# Patient Record
Sex: FEMALE | NOT HISPANIC OR LATINO | URBAN - METROPOLITAN AREA
[De-identification: names, ages, dates, MRNs, and addresses within clinical notes are randomized per-mention and may not be internally consistent; named-entity substitution may affect disease eponyms.]

---

## 2017-09-28 ENCOUNTER — IMPORTED ENCOUNTER (OUTPATIENT)
Dept: URBAN - METROPOLITAN AREA CLINIC 50 | Facility: CLINIC | Age: 67
End: 2017-09-28

## 2017-10-05 ENCOUNTER — IMPORTED ENCOUNTER (OUTPATIENT)
Dept: URBAN - METROPOLITAN AREA CLINIC 50 | Facility: CLINIC | Age: 67
End: 2017-10-05

## 2017-10-26 ENCOUNTER — IMPORTED ENCOUNTER (OUTPATIENT)
Dept: URBAN - METROPOLITAN AREA CLINIC 50 | Facility: CLINIC | Age: 67
End: 2017-10-26

## 2017-10-31 ENCOUNTER — IMPORTED ENCOUNTER (OUTPATIENT)
Dept: URBAN - METROPOLITAN AREA CLINIC 50 | Facility: CLINIC | Age: 67
End: 2017-10-31

## 2017-11-09 ENCOUNTER — IMPORTED ENCOUNTER (OUTPATIENT)
Dept: URBAN - METROPOLITAN AREA CLINIC 50 | Facility: CLINIC | Age: 67
End: 2017-11-09

## 2017-11-09 NOTE — PATIENT DISCUSSION
"""Recommend cataract extraction with intraocular lens (IOL) OS.  Discussed the RBAs mentioned the loss part/all of vision with surgery answered patient questions and offered patient copy of consent form."" ""Phaco with IOL OS: 11/28/2017 Tecjeremy ZCB00 +22.0 Target: -0.25

## 2017-11-28 ENCOUNTER — IMPORTED ENCOUNTER (OUTPATIENT)
Dept: URBAN - METROPOLITAN AREA CLINIC 50 | Facility: CLINIC | Age: 67
End: 2017-11-28

## 2017-11-28 NOTE — PATIENT DISCUSSION
"""S/P IOL OS: Tecnis ZCB00 +22.0 (Target: -0.25) +LenSx/Arcs +TM/Omidria. Continue post operative instructions and drops per schedule. "" ""Continue Pred - Nepafenac both eyes twice a day.  follow drop schedule"""

## 2017-12-07 ENCOUNTER — IMPORTED ENCOUNTER (OUTPATIENT)
Dept: URBAN - METROPOLITAN AREA CLINIC 50 | Facility: CLINIC | Age: 67
End: 2017-12-07

## 2018-01-02 ENCOUNTER — IMPORTED ENCOUNTER (OUTPATIENT)
Dept: URBAN - METROPOLITAN AREA CLINIC 50 | Facility: CLINIC | Age: 68
End: 2018-01-02

## 2018-01-02 NOTE — PATIENT DISCUSSION
"""S/P IOL OU: OD: Tecnis ZCB00 +22.0 (Target: Distance)Femto/Arcs +TMOmidria. OS: Tecnis ZCB00 +22.0 (Target: -0.25)/Arcs +TM. ""Stop Pred - Nepafenac left eye twice a day. follow drop schedule"" ""Stop Durezol right eye twice a day.  until sample runs out."""

## 2018-04-24 ENCOUNTER — IMPORTED ENCOUNTER (OUTPATIENT)
Dept: URBAN - METROPOLITAN AREA CLINIC 50 | Facility: CLINIC | Age: 68
End: 2018-04-24

## 2018-04-24 NOTE — PATIENT DISCUSSION
"""Recommended OTC artificial tears two - four times a day as needed. "" ""Start Zaditor both eyes twice a day.  as needed"""

## 2018-10-22 ENCOUNTER — IMPORTED ENCOUNTER (OUTPATIENT)
Dept: URBAN - METROPOLITAN AREA CLINIC 50 | Facility: CLINIC | Age: 68
End: 2018-10-22

## 2019-01-14 ENCOUNTER — IMPORTED ENCOUNTER (OUTPATIENT)
Dept: URBAN - METROPOLITAN AREA CLINIC 50 | Facility: CLINIC | Age: 69
End: 2019-01-14

## 2019-03-11 ENCOUNTER — IMPORTED ENCOUNTER (OUTPATIENT)
Dept: URBAN - METROPOLITAN AREA CLINIC 50 | Facility: CLINIC | Age: 69
End: 2019-03-11

## 2020-04-28 NOTE — PATIENT DISCUSSION
CATARACTS, OU - VISUALLY SIGNIFICANT. SCHEDULE RIGHT EYE FIRST THEN LATER IN THE LEFT EYE IF VISUAL SYMPTOMS PERSIST.

## 2020-04-28 NOTE — PATIENT DISCUSSION
Surgery Counseling: I have discussed the option of scheduling cataract surgery versus following, as well as the risks, benefits and alternatives of surgery with the patient. It was explained that the surgery is elective, there is no rush and there is no harm in waiting to have surgery. It was also explained that there is no guarantee that removing the cataract will improve their vision. The patient understands and desires to proceed with cataract surgery with implantation of an intraocular lens  to improve vision.

## 2020-04-28 NOTE — PATIENT DISCUSSION
New Prescription: prednisol ace-gatiflox-bromfen (prednisol ace-gatiflox-bromfen): drops,suspension: 1-0.5-0.075% 1 drop four times a day into affected eye 04-

## 2020-04-28 NOTE — PATIENT DISCUSSION
CAT SX OD - PATIENT IS ON FLOMAX SO WILL NEED PUPIL DILATORS , VISION BLUE, AND SUGARCAINE. WILL DO RESTOR TORIC LENS OD WITH [address]. PATIENT UNDERSTANDS RISK OF GLARE AND HALOES.

## 2020-05-07 NOTE — PATIENT DISCUSSION
Continue: prednisol ace-gatiflox-bromfen (prednisol ace-gatiflox-bromfen): drops,suspension: 1-0.5-0.075% 1 drop four times a day into affected eye 04-

## 2020-05-07 NOTE — PATIENT DISCUSSION
S/P PHACO W/IOL,OD: FLUID RELEASED FROM AB EXTERNA INCISION AT SLIT LAMP WITHOUT DIFFICULTY. REPEAT IOP CHECK  (12mmhg). INSTILLED ONE DROP OF ANTIBIOTIC IMMEDIATELY. CONTINUE TO TAPER DROPS AS DIRECTED. DISCONTINUE ANTIBIOTIC DROP IN 1 WEEK. RETURN FOR FOLLOW-UP AS SCHEDULED.

## 2020-05-13 NOTE — PATIENT DISCUSSION
CATARACT SX OS RESTOR WITH +3 ADD. WILL NEED VISION BLUE, SUGAR RICHARD, AND PUPIL DILATORS DUE TO TAKING PROSTATE MEDS IN THE PAST.

## 2020-05-13 NOTE — PATIENT DISCUSSION
Pre-Op 2nd Eye Counseling: The patient has noticed an improvement in their visual symptoms in the operative eye. The patient complains of decreased vision in the fellow eye when driving and near. It was explained to the patient that the decision to proceed with cataract surgery in the fellow eye is entirely a separate decision from the surgical eye. All of the same risks, benefits and alternatives ere reviewed with the patient again. The patient does feel the vision in the non-operative eye is limiting their daily activities and elects to proceed with cataract surgery in the left eye.

## 2020-06-25 ENCOUNTER — IMPORTED ENCOUNTER (OUTPATIENT)
Dept: URBAN - METROPOLITAN AREA CLINIC 50 | Facility: CLINIC | Age: 70
End: 2020-06-25

## 2020-06-25 NOTE — PATIENT DISCUSSION
"""Dry eyes OU seen on todays exam."" ""Restart Artificial tears both eyes two - four times a day . """

## 2021-04-17 ASSESSMENT — TONOMETRY
OS_IOP_MMHG: 14
OD_IOP_MMHG: 15
OD_IOP_MMHG: 14
OD_IOP_MMHG: 14
OD_IOP_MMHG: 13
OD_IOP_MMHG: 12
OS_IOP_MMHG: 13
OS_IOP_MMHG: 15
OD_IOP_MMHG: 13
OD_IOP_MMHG: 17
OS_IOP_MMHG: 14
OD_IOP_MMHG: 14
OD_IOP_MMHG: 13
OD_IOP_MMHG: 13
OS_IOP_MMHG: 16
OD_IOP_MMHG: 24
OS_IOP_MMHG: 14
OS_IOP_MMHG: 13
OS_IOP_MMHG: 14
OS_IOP_MMHG: 17

## 2021-04-17 ASSESSMENT — VISUAL ACUITY
OS_OTHER: 20/25. 20/30.
OD_SC: 20/30
OS_SC: 20/20-1
OS_SC: 20/50
OD_SC: 20/25-2
OD_SC: 20/70
OS_SC: 20/20
OD_OTHER: 20/30. 20/50.
OD_SC: 20/30
OD_CC: 20/30
OD_CC: 20/30
OS_SC: 20/25+1
OD_BAT: 20/30
OS_CC: J1
OD_OTHER: 20/40. 20/40.
OD_CC: J1+@ 18 IN
OS_OTHER: 20/40. 20/40.
OS_CC: 20/50
OS_BAT: 20/40
OD_SC: 20/30+
OS_SC: 20/30
OS_SC: 20/30+1
OD_SC: 20/25
OS_OTHER: 20/40. 20/60.
OS_BAT: 20/40
OD_BAT: 20/40
OS_BAT: 20/40
OS_BAT: 20/40
OD_PH: 20/30
OD_PH: 20/25-
OS_CC: 20/30
OD_CC: J1
OS_OTHER: 20/40. 20/50.
OS_CC: J1+@ 18 IN
OS_BAT: 20/40
OD_SC: 20/20-1
OD_OTHER: 20/50. 20/70.
OD_OTHER: 20/30. 20/50.
OS_SC: 20/25
OS_CC: 20/30
OS_PH: 20/30
OS_BAT: 20/25
OS_OTHER: 20/40. 20/50.
OS_OTHER: 20/40. 20/40.
OD_CC: J1+@ 13 IN
OD_BAT: 20/30
OD_SC: 20/25+2
OD_BAT: 20/50
OD_SC: 20/25
OS_SC: 20/25-1
OS_CC: J1+@ 13 IN
OD_CC: J2@ 17 IN
OS_CC: J2@ 17 IN

## 2021-06-16 ENCOUNTER — PREPPED CHART (OUTPATIENT)
Dept: URBAN - METROPOLITAN AREA CLINIC 53 | Facility: CLINIC | Age: 71
End: 2021-06-16

## 2021-06-16 NOTE — PATIENT DISCUSSION
"""Dry eyes OU seen on todays exam."" ""Restart Artificial tears both eyes two - four times a day . ""."

## 2021-06-17 ENCOUNTER — COMPREHENSIVE EXAM (OUTPATIENT)
Dept: URBAN - METROPOLITAN AREA CLINIC 53 | Facility: CLINIC | Age: 71
End: 2021-06-17

## 2021-06-17 DIAGNOSIS — H16.223: ICD-10-CM

## 2021-06-17 PROCEDURE — 92014 COMPRE OPH EXAM EST PT 1/>: CPT

## 2021-06-17 ASSESSMENT — TONOMETRY
OD_IOP_MMHG: 14
OS_IOP_MMHG: 14

## 2021-06-17 ASSESSMENT — VISUAL ACUITY
OD_SC: 20/20
OU_SC: 20/20
OU_CC: J1+ @ 16 IN
OS_SC: 20/20

## 2022-08-11 ENCOUNTER — COMPREHENSIVE EXAM (OUTPATIENT)
Dept: URBAN - METROPOLITAN AREA CLINIC 53 | Facility: CLINIC | Age: 72
End: 2022-08-11

## 2022-08-11 DIAGNOSIS — H16.223: ICD-10-CM

## 2022-08-11 DIAGNOSIS — H43.811: ICD-10-CM

## 2022-08-11 PROCEDURE — 92014 COMPRE OPH EXAM EST PT 1/>: CPT

## 2022-08-11 ASSESSMENT — TONOMETRY
OD_IOP_MMHG: 15
OS_IOP_MMHG: 16

## 2022-08-11 ASSESSMENT — VISUAL ACUITY
OS_SC: 20/25
OD_SC: 20/25
OU_SC: J1@14

## 2023-01-05 NOTE — PATIENT DISCUSSION
GAVE A SAMPLE OF LOTEMAX TO USE TID UNTIL SAMPLE RUNS OUT.  START ARTIFICIAL TEARS DURING THE DAY.  SHOULD HELP CLEAR UP THE NEAR VISION ON THE RIGHT EYE.  LENS WELL CENTERED FROM CAT SX.  VISION STILL 20/20 WITH BOTH EYES.

## 2023-08-11 ENCOUNTER — COMPREHENSIVE EXAM (OUTPATIENT)
Dept: URBAN - METROPOLITAN AREA CLINIC 53 | Facility: CLINIC | Age: 73
End: 2023-08-11

## 2023-08-11 DIAGNOSIS — H43.811: ICD-10-CM

## 2023-08-11 DIAGNOSIS — H16.223: ICD-10-CM

## 2023-08-11 PROCEDURE — 92014 COMPRE OPH EXAM EST PT 1/>: CPT

## 2023-08-11 ASSESSMENT — VISUAL ACUITY
OD_SC: 20/25-2
OS_SC: 20/20

## 2023-08-11 ASSESSMENT — TONOMETRY
OS_IOP_MMHG: 15
OD_IOP_MMHG: 14

## 2024-03-12 NOTE — PATIENT DISCUSSION
Discussed the importance of regular follow ups because glaucoma can lead to irreversible vision loss. 93

## 2024-08-22 ENCOUNTER — COMPREHENSIVE EXAM (OUTPATIENT)
Dept: URBAN - METROPOLITAN AREA CLINIC 53 | Facility: CLINIC | Age: 74
End: 2024-08-22

## 2024-08-22 DIAGNOSIS — H16.223: ICD-10-CM

## 2024-08-22 DIAGNOSIS — H43.811: ICD-10-CM

## 2024-08-22 PROCEDURE — 92014 COMPRE OPH EXAM EST PT 1/>: CPT

## 2024-08-22 ASSESSMENT — TONOMETRY
OS_IOP_MMHG: 14
OD_IOP_MMHG: 14

## 2024-08-22 ASSESSMENT — VISUAL ACUITY
OS_SC: J2
OD_SC: 20/20-1
OD_SC: J2
OS_SC: 20/20

## 2024-11-22 NOTE — PATIENT DISCUSSION
"""Continue: Artificial Tears:  Both Eyes
"""Recommend cataract extraction with intraocular lens (IOL) OD.  Discussed the RBAs mentioned the loss part/all of vision with surgery answered patient questions and offered patient copy of consent form."" ""Phaco with IOL OD: 10/31/2017 Tecjeremy ZCB00 +22.0 Target: Distance
n/a

## 2025-08-27 ENCOUNTER — COMPREHENSIVE EXAM (OUTPATIENT)
Age: 75
End: 2025-08-27

## 2025-08-27 DIAGNOSIS — H43.811: ICD-10-CM

## 2025-08-27 DIAGNOSIS — H16.223: ICD-10-CM

## 2025-08-27 PROCEDURE — 99214 OFFICE O/P EST MOD 30 MIN: CPT
